# Patient Record
Sex: FEMALE | Race: WHITE | NOT HISPANIC OR LATINO
[De-identification: names, ages, dates, MRNs, and addresses within clinical notes are randomized per-mention and may not be internally consistent; named-entity substitution may affect disease eponyms.]

---

## 2019-04-23 PROBLEM — Z00.00 ENCOUNTER FOR PREVENTIVE HEALTH EXAMINATION: Status: ACTIVE | Noted: 2019-04-23

## 2019-05-02 ENCOUNTER — APPOINTMENT (OUTPATIENT)
Dept: ULTRASOUND IMAGING | Facility: CLINIC | Age: 54
End: 2019-05-02

## 2019-05-02 ENCOUNTER — APPOINTMENT (OUTPATIENT)
Dept: MAMMOGRAPHY | Facility: CLINIC | Age: 54
End: 2019-05-02
Payer: COMMERCIAL

## 2019-05-02 PROCEDURE — 77063 BREAST TOMOSYNTHESIS BI: CPT

## 2019-05-02 PROCEDURE — 76641 ULTRASOUND BREAST COMPLETE: CPT | Mod: 50

## 2019-05-02 PROCEDURE — 77067 SCR MAMMO BI INCL CAD: CPT

## 2022-10-28 ENCOUNTER — APPOINTMENT (OUTPATIENT)
Dept: ORTHOPEDIC SURGERY | Facility: CLINIC | Age: 57
End: 2022-10-28

## 2022-10-28 VITALS — WEIGHT: 142 LBS | BODY MASS INDEX: 25.16 KG/M2 | HEIGHT: 63 IN

## 2022-10-28 DIAGNOSIS — Z78.9 OTHER SPECIFIED HEALTH STATUS: ICD-10-CM

## 2022-10-28 DIAGNOSIS — S46.011A STRAIN OF MUSCLE(S) AND TENDON(S) OF THE ROTATOR CUFF OF RIGHT SHOULDER, INITIAL ENCOUNTER: ICD-10-CM

## 2022-10-28 DIAGNOSIS — M75.41 IMPINGEMENT SYNDROME OF RIGHT SHOULDER: ICD-10-CM

## 2022-10-28 DIAGNOSIS — M75.21 BICIPITAL TENDINITIS, RIGHT SHOULDER: ICD-10-CM

## 2022-10-28 DIAGNOSIS — Z87.891 PERSONAL HISTORY OF NICOTINE DEPENDENCE: ICD-10-CM

## 2022-10-28 DIAGNOSIS — M75.01 ADHESIVE CAPSULITIS OF RIGHT SHOULDER: ICD-10-CM

## 2022-10-28 PROCEDURE — 73030 X-RAY EXAM OF SHOULDER: CPT | Mod: RT

## 2022-10-28 PROCEDURE — 99203 OFFICE O/P NEW LOW 30 MIN: CPT

## 2022-10-28 RX ORDER — MELOXICAM 15 MG/1
15 TABLET ORAL
Qty: 20 | Refills: 0 | Status: ACTIVE | COMMUNITY
Start: 2022-10-28 | End: 1900-01-01

## 2022-11-15 ENCOUNTER — FORM ENCOUNTER (OUTPATIENT)
Age: 57
End: 2022-11-15

## 2022-11-16 NOTE — DISCUSSION/SUMMARY
[de-identified] : Patient sent for MRI of right shoulder and will follow up for results and further evaluation \par Patient prescribed anti inflammatory meloxicam in office today\par Patient give PT script in office today\par Discussed importance of non-impact exercise and muscle stretching before and after exercise. Explained the importance of ice and rest.

## 2022-11-16 NOTE — PHYSICAL EXAM
[] : no AC joint tenderness [Right] : right shoulder [There are no fractures, subluxations or dislocations. No significant abnormalities are seen] : There are no fractures, subluxations or dislocations. No significant abnormalities are seen [Type 2 acromion] : Type 2 acromion [FreeTextEntry8] : deltoid tenderness [TWNoteComboBox7] : active forward flexion 120 degrees [de-identified] : active abduction 90 degrees [TWNoteComboBox6] : internal rotation L5 [de-identified] : external rotation 45 degrees

## 2022-11-16 NOTE — HISTORY OF PRESENT ILLNESS
[de-identified] : Date of Injury/Onset:      1 year ago\par Pain: At Rest: 8 /10   \par With Activity: 10 /10 \par Affecting Sleep: YES\par Difficulty with stairs:\par Difficulty getting in and out of car:\par Sit to stand stiffness:\par Mechanism of injury:  NKI\par This is not a Work Related Injury being treated under Worker's Compensation.\par This  is not   an athletic injury occurring associated with an interscholastic or organized sports team.\par Quality of symptoms:  C/O PULLING PAIN IN NECK RIGHT SIDE DOWN TO MID HUMEROUS.  DIFFICULTY WITH ROM, PAIN IN ARMPIT\par NO NUMBNESS OR TINGLING. \par Improves with: NOTHING   \par Worse with:  MOTION  \par Previous Treatment/Imaging/Studies Since Last Visit: HAD INJECTION 1 YEAR AGO- HELPED FOR 3 MONTHS, WENT TO PT \par Reports Available For Review Today: \par PATIENT REPORTS SHE HAD HNP X3 CERVICAL SPINE YEARS AGO WITH EPIDURAL INJECTIONS

## 2022-12-27 ENCOUNTER — FORM ENCOUNTER (OUTPATIENT)
Age: 57
End: 2022-12-27

## 2022-12-28 ENCOUNTER — FORM ENCOUNTER (OUTPATIENT)
Age: 57
End: 2022-12-28

## 2024-03-01 ENCOUNTER — OFFICE (OUTPATIENT)
Facility: LOCATION | Age: 59
Setting detail: OPHTHALMOLOGY
End: 2024-03-01
Payer: COMMERCIAL

## 2024-03-01 DIAGNOSIS — H16.223: ICD-10-CM

## 2024-03-01 DIAGNOSIS — H25.13: ICD-10-CM

## 2024-03-01 DIAGNOSIS — H02.831: ICD-10-CM

## 2024-03-01 DIAGNOSIS — H31.29: ICD-10-CM

## 2024-03-01 DIAGNOSIS — H02.834: ICD-10-CM

## 2024-03-01 PROCEDURE — 92014 COMPRE OPH EXAM EST PT 1/>: CPT | Performed by: OPHTHALMOLOGY

## 2024-03-01 PROCEDURE — 92250 FUNDUS PHOTOGRAPHY W/I&R: CPT | Performed by: OPHTHALMOLOGY

## 2024-03-04 PROBLEM — H02.831 DERMATOCHALASIS; RIGHT UPPER LID, LEFT UPPER LID: Status: ACTIVE | Noted: 2024-03-01

## 2024-03-04 PROBLEM — H02.834 DERMATOCHALASIS; RIGHT UPPER LID, LEFT UPPER LID: Status: ACTIVE | Noted: 2024-03-01

## 2024-03-04 PROBLEM — H31.29 PERIPAPILLARY ATROPHY ; BOTH EYES: Status: ACTIVE | Noted: 2024-03-01

## 2024-03-04 ASSESSMENT — REFRACTION_CURRENTRX
OS_CYLINDER: SPHERE
OS_VPRISM_DIRECTION: SV
OS_OVR_VA: 20/
OD_SPHERE: +2.50
OS_SPHERE: +2.50
OD_VPRISM_DIRECTION: SV
OD_OVR_VA: 20/
OD_CYLINDER: SPHERE

## 2024-03-04 ASSESSMENT — REFRACTION_MANIFEST
OS_VA2: 20/20(J1+)
OD_SPHERE: +0.25
OS_AXIS: 090
OD_VA2: 20/20(J1+)
OD_AXIS: 080
OD_VA1: 20/20
OD_ADD: +2.50
OU_VA: 20/20
OD_CYLINDER: +0.50
OS_SPHERE: +0.25
OS_VA1: 20/20
OS_CYLINDER: +0.50
OS_ADD: +2.50

## 2024-03-04 ASSESSMENT — SPHEQUIV_DERIVED
OD_SPHEQUIV: 0.5
OS_SPHEQUIV: 0.5

## 2024-11-15 ENCOUNTER — OFFICE (OUTPATIENT)
Facility: LOCATION | Age: 59
Setting detail: OPHTHALMOLOGY
End: 2024-11-15
Payer: COMMERCIAL

## 2024-11-15 ENCOUNTER — RX ONLY (RX ONLY)
Age: 59
End: 2024-11-15

## 2024-11-15 DIAGNOSIS — S05.02XA: ICD-10-CM

## 2024-11-15 PROCEDURE — 99213 OFFICE O/P EST LOW 20 MIN: CPT | Performed by: OPHTHALMOLOGY

## 2024-11-15 ASSESSMENT — LID EXAM ASSESSMENTS
OD_DERMATOCHALASIS: 3+
OS_DERMATOCHALASIS: 1+
OS_BLEPHARITIS: 1+
OD_BLEPHARITIS: 1+

## 2024-11-15 ASSESSMENT — SUPERFICIAL PUNCTATE KERATITIS (SPK)
OD_SPK: 1+
OS_SPK: 1+

## 2024-11-15 ASSESSMENT — CORNEAL TRAUMA - ABRASION: OS_ABRASION: PRESENT

## 2024-11-15 ASSESSMENT — CONFRONTATIONAL VISUAL FIELD TEST (CVF)
OD_FINDINGS: FULL
OS_FINDINGS: FULL

## 2024-11-19 ASSESSMENT — REFRACTION_CURRENTRX
OS_SPHERE: +2.50
OD_SPHERE: +2.50
OD_VPRISM_DIRECTION: SV
OD_CYLINDER: SPHERE
OS_VPRISM_DIRECTION: SV
OS_OVR_VA: 20/
OS_CYLINDER: SPHERE
OD_OVR_VA: 20/

## 2024-11-19 ASSESSMENT — VISUAL ACUITY
OS_BCVA: 20/30-2
OD_BCVA: 20/30-1

## 2024-11-19 ASSESSMENT — KERATOMETRY
OS_K1POWER_DIOPTERS: 44.50
OD_AXISANGLE_DEGREES: 016
OD_K1POWER_DIOPTERS: 43.50
OS_K2POWER_DIOPTERS: 45.50
OS_AXISANGLE_DEGREES: 106
OD_K2POWER_DIOPTERS: 44.50

## 2024-11-19 ASSESSMENT — REFRACTION_AUTOREFRACTION
OD_CYLINDER: +0.50
OD_SPHERE: +1.00
OS_AXIS: 103
OD_AXIS: 081
OS_CYLINDER: +0.75
OS_SPHERE: +1.00

## 2024-11-19 ASSESSMENT — REFRACTION_MANIFEST
OS_VA2: 20/20(J1+)
OD_SPHERE: +0.25
OS_CYLINDER: +0.50
OD_VA2: 20/20(J1+)
OS_ADD: +2.50
OD_CYLINDER: +0.50
OS_VA1: 20/20
OD_VA1: 20/20
OU_VA: 20/20
OS_AXIS: 090
OD_ADD: +2.50
OS_SPHERE: +0.25
OD_AXIS: 080

## 2024-11-22 ENCOUNTER — OFFICE (OUTPATIENT)
Facility: LOCATION | Age: 59
Setting detail: OPHTHALMOLOGY
End: 2024-11-22
Payer: COMMERCIAL

## 2024-11-22 ENCOUNTER — RX ONLY (RX ONLY)
Age: 59
End: 2024-11-22

## 2024-11-22 DIAGNOSIS — S05.02XD: ICD-10-CM

## 2024-11-22 PROBLEM — H02.83 DERMATOCHALSIS ; BOTH EYES: Status: ACTIVE | Noted: 2024-11-15

## 2024-11-22 PROBLEM — H11.823 CONJUNCTIVOCHALASIS; BOTH EYES: Status: ACTIVE | Noted: 2024-11-15

## 2024-11-22 PROBLEM — H25.13 CATARACT NUCLEAR SCLEROSIS AGE RELATED; BOTH EYES: Status: ACTIVE | Noted: 2024-11-15

## 2024-11-22 PROBLEM — H10.433 CONJUNCTIVITIS CHRONIC FOLLICULAR; BOTH EYES: Status: ACTIVE | Noted: 2024-11-15

## 2024-11-22 PROBLEM — H01.00 BLEPHARITIS ; BOTH EYES: Status: ACTIVE | Noted: 2024-11-15

## 2024-11-22 PROBLEM — H11.153 PINGUECULA; BOTH EYES: Status: ACTIVE | Noted: 2024-11-15

## 2024-11-22 PROCEDURE — 99213 OFFICE O/P EST LOW 20 MIN: CPT | Performed by: OPHTHALMOLOGY

## 2024-11-22 ASSESSMENT — SUPERFICIAL PUNCTATE KERATITIS (SPK)
OD_SPK: 1+
OS_SPK: 1+

## 2024-11-22 ASSESSMENT — LID EXAM ASSESSMENTS
OD_DERMATOCHALASIS: 3+
OS_DERMATOCHALASIS: 1+
OS_BLEPHARITIS: 1+
OD_BLEPHARITIS: 1+

## 2024-11-22 ASSESSMENT — CONFRONTATIONAL VISUAL FIELD TEST (CVF)
OD_FINDINGS: FULL
OS_FINDINGS: FULL

## 2024-11-22 ASSESSMENT — CORNEAL TRAUMA - ABRASION: OS_ABRASION: PRESENT

## 2024-11-23 ASSESSMENT — REFRACTION_MANIFEST
OS_ADD: +2.50
OD_SPHERE: +0.25
OU_VA: 20/20
OS_AXIS: 090
OS_SPHERE: +0.25
OS_CYLINDER: +0.50
OD_VA2: 20/20(J1+)
OS_VA1: 20/20
OD_CYLINDER: +0.50
OD_AXIS: 080
OD_VA1: 20/20
OD_ADD: +2.50
OS_VA2: 20/20(J1+)

## 2024-11-23 ASSESSMENT — REFRACTION_CURRENTRX
OS_OVR_VA: 20/
OS_VPRISM_DIRECTION: SV
OD_SPHERE: +2.50
OD_CYLINDER: SPHERE
OD_OVR_VA: 20/
OS_CYLINDER: SPHERE
OS_SPHERE: +2.50
OD_VPRISM_DIRECTION: SV

## 2024-11-23 ASSESSMENT — KERATOMETRY
OD_K2POWER_DIOPTERS: 44.50
OS_K2POWER_DIOPTERS: 45.50
OD_AXISANGLE_DEGREES: 016
OS_AXISANGLE_DEGREES: 106
OS_K1POWER_DIOPTERS: 44.50
OD_K1POWER_DIOPTERS: 43.50

## 2024-11-23 ASSESSMENT — REFRACTION_AUTOREFRACTION
OS_AXIS: 103
OD_CYLINDER: +0.50
OS_CYLINDER: +0.75
OS_SPHERE: +1.00
OD_AXIS: 081
OD_SPHERE: +1.00

## 2024-11-23 ASSESSMENT — VISUAL ACUITY
OD_BCVA: 20/30+2
OS_BCVA: 20/25-1